# Patient Record
Sex: MALE | Race: WHITE | Employment: FULL TIME | URBAN - METROPOLITAN AREA
[De-identification: names, ages, dates, MRNs, and addresses within clinical notes are randomized per-mention and may not be internally consistent; named-entity substitution may affect disease eponyms.]

---

## 2023-08-11 ENCOUNTER — APPOINTMENT (OUTPATIENT)
Dept: CT IMAGING | Age: 43
End: 2023-08-11
Payer: COMMERCIAL

## 2023-08-11 ENCOUNTER — HOSPITAL ENCOUNTER (EMERGENCY)
Age: 43
Discharge: HOME | End: 2023-08-11
Attending: EMERGENCY MEDICINE
Payer: COMMERCIAL

## 2023-08-11 VITALS
TEMPERATURE: 98.4 F | HEIGHT: 71 IN | WEIGHT: 240 LBS | SYSTOLIC BLOOD PRESSURE: 146 MMHG | RESPIRATION RATE: 15 BRPM | OXYGEN SATURATION: 97 % | HEART RATE: 72 BPM | DIASTOLIC BLOOD PRESSURE: 91 MMHG | BODY MASS INDEX: 33.6 KG/M2

## 2023-08-11 DIAGNOSIS — N20.1 URETEROLITHIASIS: Primary | ICD-10-CM

## 2023-08-11 LAB
ALBUMIN SERPL-MCNC: 4.4 GM/DL (ref 3.4–5)
ALP BLD-CCNC: 49 IU/L (ref 40–129)
ALT SERPL-CCNC: 17 U/L (ref 10–40)
ANION GAP SERPL CALCULATED.3IONS-SCNC: 10 MMOL/L (ref 4–16)
AST SERPL-CCNC: 19 IU/L (ref 15–37)
BACTERIA: ABNORMAL /HPF
BASOPHILS ABSOLUTE: 0 K/CU MM
BASOPHILS RELATIVE PERCENT: 0.4 % (ref 0–1)
BILIRUB SERPL-MCNC: 0.3 MG/DL (ref 0–1)
BILIRUBIN URINE: NEGATIVE MG/DL
BLOOD, URINE: ABNORMAL
BUN SERPL-MCNC: 21 MG/DL (ref 6–23)
CALCIUM SERPL-MCNC: 9.8 MG/DL (ref 8.3–10.6)
CAST TYPE: ABNORMAL /HPF
CHLORIDE BLD-SCNC: 109 MMOL/L (ref 99–110)
CLARITY: CLEAR
CO2: 23 MMOL/L (ref 21–32)
COLOR: YELLOW
CREAT SERPL-MCNC: 1.6 MG/DL (ref 0.9–1.3)
CRYSTAL TYPE: NEGATIVE /HPF
DIFFERENTIAL TYPE: ABNORMAL
EOSINOPHILS ABSOLUTE: 0.2 K/CU MM
EOSINOPHILS RELATIVE PERCENT: 1.6 % (ref 0–3)
EPITHELIAL CELLS, UA: NEGATIVE /HPF
GFR SERPL CREATININE-BSD FRML MDRD: 54 ML/MIN/1.73M2
GLUCOSE SERPL-MCNC: 115 MG/DL (ref 70–99)
GLUCOSE, URINE: NEGATIVE MG/DL
HCT VFR BLD CALC: 41.4 % (ref 42–52)
HEMOGLOBIN: 14.1 GM/DL (ref 13.5–18)
IMMATURE NEUTROPHIL %: 0.2 % (ref 0–0.43)
KETONES, URINE: NEGATIVE MG/DL
LEUKOCYTE ESTERASE, URINE: NEGATIVE
LIPASE: 22 IU/L (ref 13–60)
LYMPHOCYTES ABSOLUTE: 1.4 K/CU MM
LYMPHOCYTES RELATIVE PERCENT: 14 % (ref 24–44)
MCH RBC QN AUTO: 29.9 PG (ref 27–31)
MCHC RBC AUTO-ENTMCNC: 34.1 % (ref 32–36)
MCV RBC AUTO: 87.9 FL (ref 78–100)
MONOCYTES ABSOLUTE: 0.9 K/CU MM
MONOCYTES RELATIVE PERCENT: 8.9 % (ref 0–4)
NITRITE URINE, QUANTITATIVE: NEGATIVE
PDW BLD-RTO: 11.9 % (ref 11.7–14.9)
PH, URINE: 5.5 (ref 5–8)
PLATELET # BLD: 185 K/CU MM (ref 140–440)
PMV BLD AUTO: 10.3 FL (ref 7.5–11.1)
POTASSIUM SERPL-SCNC: 3.7 MMOL/L (ref 3.5–5.1)
PROTEIN UA: NEGATIVE MG/DL
RBC # BLD: 4.71 M/CU MM (ref 4.6–6.2)
RBC URINE: >100 /HPF (ref 0–3)
SEGMENTED NEUTROPHILS ABSOLUTE COUNT: 7.2 K/CU MM
SEGMENTED NEUTROPHILS RELATIVE PERCENT: 74.9 % (ref 36–66)
SODIUM BLD-SCNC: 142 MMOL/L (ref 135–145)
SPECIFIC GRAVITY UA: 1.02 (ref 1–1.03)
TOTAL IMMATURE NEUTOROPHIL: 0.02 K/CU MM
TOTAL PROTEIN: 7 GM/DL (ref 6.4–8.2)
UROBILINOGEN, URINE: 0.2 MG/DL (ref 0.2–1)
WBC # BLD: 9.7 K/CU MM (ref 4–10.5)
WBC UA: 10 /HPF (ref 0–2)

## 2023-08-11 PROCEDURE — 99284 EMERGENCY DEPT VISIT MOD MDM: CPT

## 2023-08-11 PROCEDURE — 80053 COMPREHEN METABOLIC PANEL: CPT

## 2023-08-11 PROCEDURE — 87086 URINE CULTURE/COLONY COUNT: CPT

## 2023-08-11 PROCEDURE — 96365 THER/PROPH/DIAG IV INF INIT: CPT

## 2023-08-11 PROCEDURE — 74176 CT ABD & PELVIS W/O CONTRAST: CPT

## 2023-08-11 PROCEDURE — 96361 HYDRATE IV INFUSION ADD-ON: CPT

## 2023-08-11 PROCEDURE — 85025 COMPLETE CBC W/AUTO DIFF WBC: CPT

## 2023-08-11 PROCEDURE — 2580000003 HC RX 258: Performed by: EMERGENCY MEDICINE

## 2023-08-11 PROCEDURE — 81001 URINALYSIS AUTO W/SCOPE: CPT

## 2023-08-11 PROCEDURE — 6360000002 HC RX W HCPCS: Performed by: EMERGENCY MEDICINE

## 2023-08-11 PROCEDURE — 96375 TX/PRO/DX INJ NEW DRUG ADDON: CPT

## 2023-08-11 PROCEDURE — 83690 ASSAY OF LIPASE: CPT

## 2023-08-11 RX ORDER — LOSARTAN POTASSIUM 50 MG/1
TABLET ORAL
COMMUNITY
Start: 2023-05-19

## 2023-08-11 RX ORDER — CEPHALEXIN 500 MG/1
500 CAPSULE ORAL 2 TIMES DAILY
Qty: 14 CAPSULE | Refills: 0 | Status: SHIPPED | OUTPATIENT
Start: 2023-08-11 | End: 2023-08-18

## 2023-08-11 RX ORDER — FENOFIBRATE 160 MG/1
TABLET ORAL
COMMUNITY
Start: 2023-05-10

## 2023-08-11 RX ORDER — OXYCODONE HYDROCHLORIDE AND ACETAMINOPHEN 5; 325 MG/1; MG/1
1 TABLET ORAL EVERY 6 HOURS PRN
Qty: 12 TABLET | Refills: 0 | Status: SHIPPED | OUTPATIENT
Start: 2023-08-11 | End: 2023-08-14

## 2023-08-11 RX ORDER — KETOROLAC TROMETHAMINE 30 MG/ML
30 INJECTION, SOLUTION INTRAMUSCULAR; INTRAVENOUS ONCE
Status: COMPLETED | OUTPATIENT
Start: 2023-08-11 | End: 2023-08-11

## 2023-08-11 RX ORDER — TAMSULOSIN HYDROCHLORIDE 0.4 MG/1
0.4 CAPSULE ORAL DAILY
Qty: 30 CAPSULE | Refills: 0 | Status: SHIPPED | OUTPATIENT
Start: 2023-08-11

## 2023-08-11 RX ORDER — TESTOSTERONE 12.5 MG/1.25G
GEL TOPICAL
COMMUNITY
Start: 2023-06-02

## 2023-08-11 RX ORDER — NAPROXEN 500 MG/1
500 TABLET ORAL 2 TIMES DAILY WITH MEALS
Qty: 60 TABLET | Refills: 0 | Status: SHIPPED | OUTPATIENT
Start: 2023-08-11

## 2023-08-11 RX ORDER — 0.9 % SODIUM CHLORIDE 0.9 %
1000 INTRAVENOUS SOLUTION INTRAVENOUS ONCE
Status: COMPLETED | OUTPATIENT
Start: 2023-08-11 | End: 2023-08-11

## 2023-08-11 RX ORDER — ONDANSETRON 4 MG/1
4 TABLET, ORALLY DISINTEGRATING ORAL 3 TIMES DAILY PRN
Qty: 21 TABLET | Refills: 0 | Status: SHIPPED | OUTPATIENT
Start: 2023-08-11

## 2023-08-11 RX ADMIN — KETOROLAC TROMETHAMINE 30 MG: 30 INJECTION, SOLUTION INTRAMUSCULAR; INTRAVENOUS at 07:27

## 2023-08-11 RX ADMIN — CEFTRIAXONE SODIUM 1000 MG: 1 INJECTION, POWDER, FOR SOLUTION INTRAMUSCULAR; INTRAVENOUS at 08:54

## 2023-08-11 RX ADMIN — SODIUM CHLORIDE 1000 ML: 9 INJECTION, SOLUTION INTRAVENOUS at 07:28

## 2023-08-11 ASSESSMENT — PAIN SCALES - GENERAL
PAINLEVEL_OUTOF10: 0
PAINLEVEL_OUTOF10: 7

## 2023-08-11 ASSESSMENT — PAIN DESCRIPTION - PAIN TYPE: TYPE: ACUTE PAIN

## 2023-08-11 ASSESSMENT — PAIN DESCRIPTION - ORIENTATION: ORIENTATION: LEFT

## 2023-08-11 ASSESSMENT — PAIN DESCRIPTION - DESCRIPTORS: DESCRIPTORS: DULL;ACHING

## 2023-08-11 ASSESSMENT — PAIN - FUNCTIONAL ASSESSMENT
PAIN_FUNCTIONAL_ASSESSMENT: 0-10
PAIN_FUNCTIONAL_ASSESSMENT: ACTIVITIES ARE NOT PREVENTED

## 2023-08-11 ASSESSMENT — PAIN DESCRIPTION - LOCATION: LOCATION: FLANK

## 2023-08-11 ASSESSMENT — PAIN DESCRIPTION - FREQUENCY: FREQUENCY: CONTINUOUS

## 2023-08-11 NOTE — DISCHARGE INSTRUCTIONS
Please monitor for any fevers/shaking chills, intractable nausea/vomiting or intractable/worsening pain. Please return to the hospital should any of this be occurring.

## 2023-08-11 NOTE — ED NOTES
Pt reports left flank pain started yesterday with some urgency to urinate  Pt has hx of kidney stones 15 years ago      Linda Caicedo, RN  08/11/23 [id number]

## 2023-08-11 NOTE — ED PROVIDER NOTES
Triage Chief Complaint:   Flank Pain (Left side flank pain since yesterday morning )    [place]:  Brock Rivera is a 38 y.o. male that presents with left-sided flank pain. Patient was in baseline state of health until midnight approximately 30 hours ago when the above started. Pain has been waxing/waning, currently very mild, history, feels like a \"dull ache\", and rating from left flank and left lower abdomen. Some urinary urgency but no dysuria. No blood in urine. Normal bowel movements. No fevers or chills. No nausea or vomiting. No diarrhea or constipation. Patient is a history of kidney stone \"15 years ago\", but forgets what that felt like. Patient does report history of hypertension but otherwise no known medical problems. ROS:  General:  No fevers, no chills, no weakness  Eyes:  No recent vison changes, no discharge  ENT:  No sore throat, no nasal congestion, no hearing changes  Cardiovascular:  No chest pain, no palpitations  Respiratory:  No shortness of breath, no cough, no wheezing  Gastrointestinal:  + LLQ pain, no nausea, no vomiting, no diarrhea  Musculoskeletal:  No muscle pain, no joint pain  Skin:  No rash, no pruritis, no easy bruising  Neurologic:  No speech problems, no headache, no extremity numbness, no extremity tingling, no extremity weakness  Psychiatric:  No anxiety  Genitourinary:  No dysuria, no hematuria, + flank pain, + urgency  Endocrine:  No unexpected weight gain, no unexpected weight loss  Extremities:  no edema, no pain    Past Medical History:   Diagnosis Date    Hyperlipidemia     Hypertension     Kidney stone      Past Surgical History:   Procedure Laterality Date    ANTERIOR CRUCIATE LIGAMENT REPAIR Left      History reviewed. No pertinent family history.   Social History     Socioeconomic History    Marital status:      Spouse name: Not on file    Number of children: Not on file    Years of education: Not on file    Highest education level: Not on file days    NAPROXEN (NAPROSYN) 500 MG TABLET    Take 1 tablet by mouth 2 times daily (with meals)    ONDANSETRON (ZOFRAN-ODT) 4 MG DISINTEGRATING TABLET    Take 1 tablet by mouth 3 times daily as needed for Nausea or Vomiting    OXYCODONE-ACETAMINOPHEN (PERCOCET) 5-325 MG PER TABLET    Take 1 tablet by mouth every 6 hours as needed for Pain for up to 3 days. Intended supply: 3 days. Take lowest dose possible to manage pain Max Daily Amount: 4 tablets    TAMSULOSIN (FLOMAX) 0.4 MG CAPSULE    Take 1 capsule by mouth daily       Comment: Please note this report has been produced using speech recognition software and may contain errors related to that system including errors in grammar, punctuation, and spelling, as well as words and phrases that may be inappropriate. If there are any questions or concerns please feel free to contact the dictating provider for clarification.         Kirsty Roland MD  08/11/23 [address], [place]  08/11/23 [id number]

## 2023-08-12 LAB
CULTURE: NORMAL
Lab: NORMAL
SPECIMEN: NORMAL